# Patient Record
Sex: FEMALE | Race: WHITE | NOT HISPANIC OR LATINO | Employment: FULL TIME | URBAN - METROPOLITAN AREA
[De-identification: names, ages, dates, MRNs, and addresses within clinical notes are randomized per-mention and may not be internally consistent; named-entity substitution may affect disease eponyms.]

---

## 2024-05-05 ENCOUNTER — APPOINTMENT (OUTPATIENT)
Dept: RADIOLOGY | Facility: HOSPITAL | Age: 49
End: 2024-05-05
Payer: COMMERCIAL

## 2024-05-05 ENCOUNTER — HOSPITAL ENCOUNTER (EMERGENCY)
Facility: HOSPITAL | Age: 49
Discharge: HOME | End: 2024-05-05
Payer: COMMERCIAL

## 2024-05-05 VITALS
TEMPERATURE: 98.2 F | BODY MASS INDEX: 24.91 KG/M2 | WEIGHT: 155 LBS | SYSTOLIC BLOOD PRESSURE: 137 MMHG | DIASTOLIC BLOOD PRESSURE: 92 MMHG | OXYGEN SATURATION: 97 % | HEIGHT: 66 IN | HEART RATE: 95 BPM | RESPIRATION RATE: 15 BRPM

## 2024-05-05 DIAGNOSIS — M54.2 CERVICAL PAIN: Primary | ICD-10-CM

## 2024-05-05 PROCEDURE — 99284 EMERGENCY DEPT VISIT MOD MDM: CPT | Mod: 25

## 2024-05-05 PROCEDURE — 2500000005 HC RX 250 GENERAL PHARMACY W/O HCPCS: Performed by: NURSE PRACTITIONER

## 2024-05-05 PROCEDURE — 72125 CT NECK SPINE W/O DYE: CPT

## 2024-05-05 PROCEDURE — 72125 CT NECK SPINE W/O DYE: CPT | Performed by: RADIOLOGY

## 2024-05-05 PROCEDURE — 2500000001 HC RX 250 WO HCPCS SELF ADMINISTERED DRUGS (ALT 637 FOR MEDICARE OP): Performed by: NURSE PRACTITIONER

## 2024-05-05 RX ORDER — LIDOCAINE 560 MG/1
1 PATCH PERCUTANEOUS; TOPICAL; TRANSDERMAL DAILY
Status: DISCONTINUED | OUTPATIENT
Start: 2024-05-05 | End: 2024-05-05 | Stop reason: HOSPADM

## 2024-05-05 RX ORDER — OXYCODONE AND ACETAMINOPHEN 5; 325 MG/1; MG/1
1 TABLET ORAL EVERY 6 HOURS PRN
Qty: 12 TABLET | Refills: 0 | Status: SHIPPED | OUTPATIENT
Start: 2024-05-05 | End: 2024-05-08

## 2024-05-05 RX ORDER — TIZANIDINE 4 MG/1
4 TABLET ORAL EVERY 6 HOURS PRN
Qty: 30 TABLET | Refills: 0 | Status: SHIPPED | OUTPATIENT
Start: 2024-05-05 | End: 2024-05-15

## 2024-05-05 RX ORDER — NALOXONE HYDROCHLORIDE 4 MG/.1ML
4 SPRAY NASAL AS NEEDED
Qty: 1 EACH | Refills: 0 | Status: SHIPPED | OUTPATIENT
Start: 2024-05-05 | End: 2025-05-05

## 2024-05-05 RX ORDER — OXYCODONE AND ACETAMINOPHEN 5; 325 MG/1; MG/1
1 TABLET ORAL ONCE
Status: COMPLETED | OUTPATIENT
Start: 2024-05-05 | End: 2024-05-05

## 2024-05-05 RX ORDER — LIDOCAINE 50 MG/G
1 PATCH TOPICAL DAILY
Qty: 14 PATCH | Refills: 2 | Status: SHIPPED | OUTPATIENT
Start: 2024-05-05

## 2024-05-05 RX ORDER — OXYCODONE AND ACETAMINOPHEN 5; 325 MG/1; MG/1
1 TABLET ORAL EVERY 6 HOURS PRN
Qty: 12 TABLET | Refills: 0 | Status: SHIPPED | OUTPATIENT
Start: 2024-05-05 | End: 2024-05-05

## 2024-05-05 RX ADMIN — LIDOCAINE 1 PATCH: 4 PATCH TOPICAL at 19:45

## 2024-05-05 RX ADMIN — OXYCODONE HYDROCHLORIDE AND ACETAMINOPHEN 1 TABLET: 5; 325 TABLET ORAL at 19:45

## 2024-05-05 ASSESSMENT — PAIN - FUNCTIONAL ASSESSMENT: PAIN_FUNCTIONAL_ASSESSMENT: 0-10

## 2024-05-05 ASSESSMENT — LIFESTYLE VARIABLES
EVER FELT BAD OR GUILTY ABOUT YOUR DRINKING: NO
EVER HAD A DRINK FIRST THING IN THE MORNING TO STEADY YOUR NERVES TO GET RID OF A HANGOVER: NO
TOTAL SCORE: 0
HAVE PEOPLE ANNOYED YOU BY CRITICIZING YOUR DRINKING: NO
HAVE YOU EVER FELT YOU SHOULD CUT DOWN ON YOUR DRINKING: NO

## 2024-05-05 ASSESSMENT — COLUMBIA-SUICIDE SEVERITY RATING SCALE - C-SSRS
1. IN THE PAST MONTH, HAVE YOU WISHED YOU WERE DEAD OR WISHED YOU COULD GO TO SLEEP AND NOT WAKE UP?: NO
6. HAVE YOU EVER DONE ANYTHING, STARTED TO DO ANYTHING, OR PREPARED TO DO ANYTHING TO END YOUR LIFE?: NO
2. HAVE YOU ACTUALLY HAD ANY THOUGHTS OF KILLING YOURSELF?: NO

## 2024-05-05 ASSESSMENT — PAIN SCALES - GENERAL: PAINLEVEL_OUTOF10: 5 - MODERATE PAIN

## 2024-05-05 ASSESSMENT — PAIN DESCRIPTION - PAIN TYPE: TYPE: ACUTE PAIN

## 2024-05-05 ASSESSMENT — PAIN DESCRIPTION - LOCATION: LOCATION: NECK

## 2024-05-05 ASSESSMENT — PAIN DESCRIPTION - DESCRIPTORS: DESCRIPTORS: TINGLING;OTHER (COMMENT)

## 2024-05-05 NOTE — DISCHARGE INSTRUCTIONS
Continue steroids and then switch over to ibuprofen 200 to 400 mg every 6-8 hours  as needed  make sure to eat on this medication  Lidoderm patch - use daily  Use pain meds sparingly - but never when you use the muscle relaxers  Use the muscle relaxers as prescribed  Followup with doctor

## 2024-05-05 NOTE — Clinical Note
Ella Douglas was seen and treated in our emergency department on 5/5/2024.  She may return to work on 05/08/2024.       If you have any questions or concerns, please don't hesitate to call.      Elise Adair, APRN-CNP

## 2024-05-05 NOTE — ED PROVIDER NOTES
[facility]  ED  Encounter Note  Admit Date/RoomTime: 2024  5:22 PM  ED Room: [place] [address]  NAME: Ella Douglas  : 1975  MRN: [id number]     Chief Complaint:  Neck Pain    HISTORY OF PRESENT ILLNESS        Ella Douglas is a 48 y.o. female who presents to the ED for evaluation of cervical neck pain. She stated that it started nearly 5-6 days ago. Went to the  few days ago and was prescribed Steroids and muscle relaxers. Patient stated that she has not always taken the muscle relaxers as prescribed as they make her tired. She stated that she had a massage yesterday and thought helped but today the pain flared up. She stated took the steroids, nsaids and muscle relaxer. She stated that the spasms seem slightly improved now. She has some tingling in her right arm but no weakness, numbness. Denied any previous trauma or falls. Admits that she was doing a lot of yard work recently.  Denied other medications.     ROS   Pertinent positives and negatives are stated within HPI, all other systems reviewed and are negative.    Past Medical History:  has a past medical history of Personal history of other diseases of the circulatory system.    Surgical History:  has a past surgical history that includes MR angio head wo IV contrast (2022).    Social History:  reports that she has never smoked. She has never used smokeless tobacco. She reports that she does not drink alcohol and does not use drugs.    Family History: family history is not on file.     Allergies: Patient has no known allergies.    PHYSICAL EXAM   Oxygen Saturation Interpretation: Normal.     Physical Exam  Constitutional/General: Alert and oriented x3, well appearing, non toxic  HEENT:  NC/NT. PERRLA.  Airway patent.  Neck: Supple, full ROM. No midline vertebral tenderness or crepitus.   Respiratory: Lung sounds clear to auscultation bilaterally. No wheezes, rhonchi or stridor. Not in respiratory distress.  CV:   Regular rate. Regular rhythm. No murmurs or rubs. 2+ distal pulses.  GI:  Abdomen soft, non-tender, non-distended. +BS. No rebound, guarding, or rigidity. No pulsatile masses.  Musculoskeletal: Moves all extremities x 4. Warm and well perfused. Capillary refill <3 seconds  Integument: Skin warm and dry. No rashes.   Neurologic: Alert and oriented with no focal deficits, symmetric strength 5/5 in the upper and lower extremities bilaterally.  Psychiatric: Normal affect.  ++ strong  strength in both hands no cervical pain with palpitation     Lab / Imaging Results   (All laboratory and radiology results have been personally reviewed by myself)  Labs:  Results for orders placed or performed in visit on 07/01/22   CBC and Auto Differential   Result Value Ref Range    WBC 6.9 4.4 - 11.3 x10E9/L    RBC 4.67 4.00 - 5.20 x10E12/L    Hemoglobin 15.7 12.0 - 16.0 g/dL    Hematocrit 44.3 36.0 - 46.0 %    MCV 95 80 - 100 fL    MCHC 35.4 32.0 - 36.0 g/dL    Platelets 278 150 - 450 x10E9/L    RDW 13.3 11.5 - 14.5 %    Neutrophils % 58.1 40.0 - 80.0 %    Immature Granulocytes %, Automated 0.4 0.0 - 0.9 %    Lymphocytes % 31.0 13.0 - 44.0 %    Monocytes % 8.4 2.0 - 10.0 %    Eosinophils % 1.4 0.0 - 6.0 %    Basophils % 0.7 0.0 - 2.0 %    Neutrophils Absolute 4.03 1.20 - 7.70 x10E9/L    Lymphocytes Absolute 2.15 1.20 - 4.80 x10E9/L    Monocytes Absolute 0.58 0.10 - 1.00 x10E9/L    Eosinophils Absolute 0.10 0.00 - 0.70 x10E9/L    Basophils Absolute 0.05 0.00 - 0.10 x10E9/L   Comprehensive Metabolic Panel   Result Value Ref Range    Glucose 108 (H) 74 - 99 mg/dL    Sodium 138 136 - 145 mmol/L    Potassium 3.8 3.5 - 5.3 mmol/L    Chloride 101 98 - 107 mmol/L    Bicarbonate 24 21 - 32 mmol/L    Anion Gap 17 10 - 20 mmol/L    Urea Nitrogen 10 6 - 23 mg/dL    Creatinine 0.93 0.50 - 1.05 mg/dL    GFR Female 77 >90 mL/min/1.73m2    Calcium 9.6 8.6 - 10.3 mg/dL    Albumin 5.0 3.4 - 5.0 g/dL    Alkaline Phosphatase 75 33 - 110 U/L    Total  Protein 8.2 6.4 - 8.2 g/dL    AST 27 9 - 39 U/L    Total Bilirubin 2.0 (H) 0.0 - 1.2 mg/dL    ALT (SGPT) 18 7 - 45 U/L     Imaging:  All Radiology results interpreted by Radiologist unless otherwise noted.  CT cervical spine wo IV contrast   Final Result   1. No acute abnormality or substantial degenerative change identified   in the cervical spine.             Signed by: Lion Barrios 5/5/2024 6:43 PM   Dictation workstation:   [id number]          ED Course / Medical Decision Making     Medications   oxyCODONE-acetaminophen (Percocet) 5-325 mg per tablet 1 tablet (1 tablet oral Given 5/5/24 1945)     Diagnoses as of 05/05/24 2325   Cervical pain     Re-examination:    Patient’s condition stable.    Consult(s):   None    Procedure(s):   none    MDM:         Ella Douglas is a 48 y.o. female who presents to the ED for evaluation of cervical neck pain. She stated that it started nearly 5-6 days ago. Went to the  few days ago and was prescribed Steroids and muscle relaxers. Patient stated that she has not always taken the muscle relaxers as prescribed as they make her tired. She stated that she had a massage yesterday and thought helped but today the pain flared up. She stated took the steroids, nsaids and muscle relaxer. She stated that the spasms seem slightly improved now. She has some tingling in her right arm but no weakness, numbness. Denied any previous trauma or falls. Admits that she was doing a lot of yard work recently.  Denied other medications.     ED course stable  Will give dose of pain med lidoderm patch  Ct scan cervical spine showed muscle spasms.  Copy provided to pt.   Recommended muscle relaxer finish steroids, use nsaids after steroids done.  Lidoderm patch. Small amount of narcotic pain meds but don't take them together.  Followup with PCP may need physical therapy.   Discharge home  Ddx: cervical sprain /strain  Plan of Care/Counseling:  I reviewed today's visit with the patient and spouse  in addition to providing specific details for the plan of care and counseling regarding the diagnosis and prognosis.  Questions are answered at this time and are agreeable with the plan.    ASSESSMENT     1. Cervical pain      PLAN   Home Nonsteroidals, Tylenol, Narcotic pain medication, Muscle relaxants, and Analgesics  Diagnostic tests were reviewed and questions answered. Diagnosis, care plan and treatment options were discussed. The patient and spouse/SO understand instructions and will follow up as directed.  Condition completed  The patient and spouse was given verbal follow-up instructions  Patient condition is good    New Medications     New Medications Ordered This Visit   Medications    oxyCODONE-acetaminophen (Percocet) 5-325 mg per tablet 1 tablet     Order Specific Question:   If ordered PRN for pain, nurse is permitted to administer this medication for higher pain scores based on patient preference?     Answer:   Yes    tiZANidine (Zanaflex) 4 mg tablet     Sig: Take 1 tablet (4 mg) by mouth every 6 hours if needed for muscle spasms for up to 10 days.     Dispense:  30 tablet     Refill:  0    naloxone (Narcan) 4 mg/0.1 mL nasal spray     Sig: Administer 1 spray (4 mg) into affected nostril(s) if needed for opioid reversal or respiratory depression for up to 1 dose. May repeat every 2-3 minutes if needed, alternating nostrils, until medical assistance becomes available.     Dispense:  1 each     Refill:  0    lidocaine (Lidoderm) 5 % patch     Sig: Place 1 patch over 12 hours on the skin once daily. Remove & discard patch within 12 hours or as directed by MD.     Dispense:  14 patch     Refill:  2    oxyCODONE-acetaminophen (Percocet) 5-325 mg tablet     Sig: Take 1 tablet by mouth every 6 hours if needed for severe pain (7 - 10) for up to 3 days.     Dispense:  12 tablet     Refill:  0     Electronically signed by HAL Danielle-CNP   **This report was transcribed using voice recognition  software. Every effort was made to ensure accuracy; however, inadvertent computerized transcription errors may be present.  END OF ED PROVIDER NOTE     HAL Danielle-BABITA  05/05/24 3110

## 2024-05-05 NOTE — ED TRIAGE NOTES
Pt states she was working in the yard a week ago and has a stiff neck with tingling down her arm since. She has seen her PCP who gave her muscle relaxers. Pt states she has been on them for 4 days with no relief.

## 2024-08-09 ENCOUNTER — HOSPITAL ENCOUNTER (OUTPATIENT)
Dept: RADIOLOGY | Facility: HOSPITAL | Age: 49
Discharge: HOME | End: 2024-08-09
Payer: COMMERCIAL

## 2024-08-09 VITALS — HEIGHT: 66 IN | WEIGHT: 155 LBS | BODY MASS INDEX: 24.91 KG/M2

## 2024-08-09 DIAGNOSIS — Z12.31 SCREENING MAMMOGRAM FOR BREAST CANCER: ICD-10-CM

## 2024-08-09 PROCEDURE — 77067 SCR MAMMO BI INCL CAD: CPT | Performed by: STUDENT IN AN ORGANIZED HEALTH CARE EDUCATION/TRAINING PROGRAM

## 2024-08-09 PROCEDURE — 77063 BREAST TOMOSYNTHESIS BI: CPT | Performed by: STUDENT IN AN ORGANIZED HEALTH CARE EDUCATION/TRAINING PROGRAM

## 2024-08-09 PROCEDURE — 77067 SCR MAMMO BI INCL CAD: CPT

## 2024-10-05 ENCOUNTER — APPOINTMENT (OUTPATIENT)
Dept: CARDIOLOGY | Facility: HOSPITAL | Age: 49
End: 2024-10-05
Payer: COMMERCIAL

## 2024-10-05 ENCOUNTER — ANCILLARY PROCEDURE (OUTPATIENT)
Dept: URGENT CARE | Age: 49
End: 2024-10-05
Payer: COMMERCIAL

## 2024-10-05 ENCOUNTER — APPOINTMENT (OUTPATIENT)
Dept: RADIOLOGY | Facility: HOSPITAL | Age: 49
End: 2024-10-05
Payer: COMMERCIAL

## 2024-10-05 ENCOUNTER — OFFICE VISIT (OUTPATIENT)
Dept: URGENT CARE | Age: 49
End: 2024-10-05
Payer: COMMERCIAL

## 2024-10-05 ENCOUNTER — HOSPITAL ENCOUNTER (EMERGENCY)
Facility: HOSPITAL | Age: 49
Discharge: HOME | End: 2024-10-05
Payer: COMMERCIAL

## 2024-10-05 VITALS
TEMPERATURE: 98 F | DIASTOLIC BLOOD PRESSURE: 90 MMHG | OXYGEN SATURATION: 99 % | SYSTOLIC BLOOD PRESSURE: 162 MMHG | HEART RATE: 122 BPM

## 2024-10-05 VITALS
HEIGHT: 66 IN | SYSTOLIC BLOOD PRESSURE: 111 MMHG | BODY MASS INDEX: 25.71 KG/M2 | DIASTOLIC BLOOD PRESSURE: 77 MMHG | RESPIRATION RATE: 18 BRPM | OXYGEN SATURATION: 98 % | HEART RATE: 82 BPM | TEMPERATURE: 98.6 F | WEIGHT: 160 LBS

## 2024-10-05 DIAGNOSIS — R07.9 CHEST PAIN, UNSPECIFIED TYPE: ICD-10-CM

## 2024-10-05 DIAGNOSIS — R05.9 COUGH, UNSPECIFIED TYPE: ICD-10-CM

## 2024-10-05 DIAGNOSIS — R07.9 CHEST PAIN, UNSPECIFIED TYPE: Primary | ICD-10-CM

## 2024-10-05 DIAGNOSIS — R00.0 TACHYCARDIA: ICD-10-CM

## 2024-10-05 LAB
ALBUMIN SERPL BCP-MCNC: 4.9 G/DL (ref 3.4–5)
ALP SERPL-CCNC: 71 U/L (ref 33–110)
ALT SERPL W P-5'-P-CCNC: 17 U/L (ref 7–45)
ANION GAP SERPL CALC-SCNC: 16 MMOL/L (ref 10–20)
AST SERPL W P-5'-P-CCNC: 30 U/L (ref 9–39)
BASOPHILS # BLD AUTO: 0.04 X10*3/UL (ref 0–0.1)
BASOPHILS NFR BLD AUTO: 0.4 %
BILIRUB SERPL-MCNC: 1.1 MG/DL (ref 0–1.2)
BNP SERPL-MCNC: 14 PG/ML (ref 0–99)
BUN SERPL-MCNC: 14 MG/DL (ref 6–23)
CALCIUM SERPL-MCNC: 9.3 MG/DL (ref 8.6–10.3)
CARDIAC TROPONIN I PNL SERPL HS: 3 NG/L (ref 0–13)
CHLORIDE SERPL-SCNC: 102 MMOL/L (ref 98–107)
CO2 SERPL-SCNC: 22 MMOL/L (ref 21–32)
CREAT SERPL-MCNC: 0.92 MG/DL (ref 0.5–1.05)
EGFRCR SERPLBLD CKD-EPI 2021: 77 ML/MIN/1.73M*2
EOSINOPHIL # BLD AUTO: 0.03 X10*3/UL (ref 0–0.7)
EOSINOPHIL NFR BLD AUTO: 0.3 %
ERYTHROCYTE [DISTWIDTH] IN BLOOD BY AUTOMATED COUNT: 13.2 % (ref 11.5–14.5)
GLUCOSE SERPL-MCNC: 115 MG/DL (ref 74–99)
HCG UR QL IA.RAPID: NEGATIVE
HCT VFR BLD AUTO: 41.6 % (ref 36–46)
HGB BLD-MCNC: 15.1 G/DL (ref 12–16)
IMM GRANULOCYTES # BLD AUTO: 0.06 X10*3/UL (ref 0–0.7)
IMM GRANULOCYTES NFR BLD AUTO: 0.5 % (ref 0–0.9)
LYMPHOCYTES # BLD AUTO: 1.62 X10*3/UL (ref 1.2–4.8)
LYMPHOCYTES NFR BLD AUTO: 14.8 %
MCH RBC QN AUTO: 33.9 PG (ref 26–34)
MCHC RBC AUTO-ENTMCNC: 36.3 G/DL (ref 32–36)
MCV RBC AUTO: 93 FL (ref 80–100)
MONOCYTES # BLD AUTO: 0.54 X10*3/UL (ref 0.1–1)
MONOCYTES NFR BLD AUTO: 4.9 %
NEUTROPHILS # BLD AUTO: 8.65 X10*3/UL (ref 1.2–7.7)
NEUTROPHILS NFR BLD AUTO: 79.1 %
NRBC BLD-RTO: 0 /100 WBCS (ref 0–0)
PLATELET # BLD AUTO: 353 X10*3/UL (ref 150–450)
POC RAPID INFLUENZA A: NEGATIVE
POC RAPID INFLUENZA B: NEGATIVE
POC SARS-COV-2 AG BINAX: NORMAL
POTASSIUM SERPL-SCNC: 3.9 MMOL/L (ref 3.5–5.3)
PREGNANCY TEST URINE, POC: NEGATIVE
PROT SERPL-MCNC: 8.1 G/DL (ref 6.4–8.2)
RBC # BLD AUTO: 4.46 X10*6/UL (ref 4–5.2)
SODIUM SERPL-SCNC: 136 MMOL/L (ref 136–145)
WBC # BLD AUTO: 10.9 X10*3/UL (ref 4.4–11.3)

## 2024-10-05 PROCEDURE — 2500000004 HC RX 250 GENERAL PHARMACY W/ HCPCS (ALT 636 FOR OP/ED): Performed by: NURSE PRACTITIONER

## 2024-10-05 PROCEDURE — 71275 CT ANGIOGRAPHY CHEST: CPT

## 2024-10-05 PROCEDURE — 80053 COMPREHEN METABOLIC PANEL: CPT | Performed by: NURSE PRACTITIONER

## 2024-10-05 PROCEDURE — 96360 HYDRATION IV INFUSION INIT: CPT

## 2024-10-05 PROCEDURE — 36415 COLL VENOUS BLD VENIPUNCTURE: CPT | Performed by: NURSE PRACTITIONER

## 2024-10-05 PROCEDURE — 83880 ASSAY OF NATRIURETIC PEPTIDE: CPT | Performed by: NURSE PRACTITIONER

## 2024-10-05 PROCEDURE — 81025 URINE PREGNANCY TEST: CPT | Performed by: NURSE PRACTITIONER

## 2024-10-05 PROCEDURE — 85025 COMPLETE CBC W/AUTO DIFF WBC: CPT | Performed by: NURSE PRACTITIONER

## 2024-10-05 PROCEDURE — 84484 ASSAY OF TROPONIN QUANT: CPT | Performed by: NURSE PRACTITIONER

## 2024-10-05 PROCEDURE — 71275 CT ANGIOGRAPHY CHEST: CPT | Performed by: STUDENT IN AN ORGANIZED HEALTH CARE EDUCATION/TRAINING PROGRAM

## 2024-10-05 PROCEDURE — 93005 ELECTROCARDIOGRAM TRACING: CPT

## 2024-10-05 PROCEDURE — 71046 X-RAY EXAM CHEST 2 VIEWS: CPT

## 2024-10-05 PROCEDURE — 2550000001 HC RX 255 CONTRASTS: Performed by: NURSE PRACTITIONER

## 2024-10-05 PROCEDURE — 96361 HYDRATE IV INFUSION ADD-ON: CPT

## 2024-10-05 PROCEDURE — 99285 EMERGENCY DEPT VISIT HI MDM: CPT

## 2024-10-05 RX ADMIN — SODIUM CHLORIDE 1000 ML: 9 INJECTION, SOLUTION INTRAVENOUS at 19:54

## 2024-10-05 RX ADMIN — IOHEXOL 75 ML: 350 INJECTION, SOLUTION INTRAVENOUS at 20:46

## 2024-10-05 ASSESSMENT — HEART SCORE
HISTORY: SLIGHTLY SUSPICIOUS
ECG: NORMAL
TROPONIN: LESS THAN OR EQUAL TO NORMAL LIMIT
HEART SCORE: 1
AGE: 45-64
RISK FACTORS: NO KNOWN RISK FACTORS

## 2024-10-05 ASSESSMENT — PAIN SCALES - GENERAL
PAINLEVEL_OUTOF10: 0 - NO PAIN
PAINLEVEL_OUTOF10: 0 - NO PAIN

## 2024-10-05 ASSESSMENT — COLUMBIA-SUICIDE SEVERITY RATING SCALE - C-SSRS
6. HAVE YOU EVER DONE ANYTHING, STARTED TO DO ANYTHING, OR PREPARED TO DO ANYTHING TO END YOUR LIFE?: NO
1. IN THE PAST MONTH, HAVE YOU WISHED YOU WERE DEAD OR WISHED YOU COULD GO TO SLEEP AND NOT WAKE UP?: NO
2. HAVE YOU ACTUALLY HAD ANY THOUGHTS OF KILLING YOURSELF?: NO

## 2024-10-05 ASSESSMENT — PAIN - FUNCTIONAL ASSESSMENT: PAIN_FUNCTIONAL_ASSESSMENT: 0-10

## 2024-10-05 NOTE — ED PROVIDER NOTES
Chief Complaint   Patient presents with   • Rapid Heart Rate     Tachycardic over last week at rest over 100 per pt.  Chest tightness.  Elevated BP as well.  Recent long distance travel.         HPI       49 year old female presents to the Emergency Department today complaining of an elevated heart rate over the past week. Notes that she had had left sided chest tightness that has been constant, non-radiating, and varies intensity. Has had shortness of breath associated with the above. Reports that she is improving from a recent URI for which she lost her sense of smell, but tested negative for COVID. Denies any associated fever, chills, headache, neck pain, abdominal pain, nausea, vomiting, diarrhea, constipation, or urinary symptoms. Reports that she has had similar episodes of tachycardia following a viral illness in the past. States that he had recent 4 hour plane trips. No recent periods of immobilization, recent surgeries, history of cancer, lower extremity edema/pain, or prior history of DVT/PE.       History provided by:  Patient             Patient History   Past Medical History:   Diagnosis Date   • Personal history of other diseases of the circulatory system     History of varicose veins of lower extremity     Past Surgical History:   Procedure Laterality Date   • MR HEAD ANGIO WO IV CONTRAST  7/21/2022    MR HEAD ANGIO WO IV CONTRAST 7/21/2022 [facility]     No family history on file.  Social History     Tobacco Use   • Smoking status: Never   • Smokeless tobacco: Never   Substance Use Topics   • Alcohol use: Never   • Drug use: Never           Physical Exam  Constitutional:       Appearance: Normal appearance.   HENT:      Head: Normocephalic.      Right Ear: Tympanic membrane, ear canal and external ear normal.      Left Ear: Tympanic membrane, ear canal and external ear normal.      Nose: Nose normal.      Mouth/Throat:      Mouth: Mucous membranes are moist.      Pharynx: Oropharynx is  clear. No oropharyngeal exudate or posterior oropharyngeal erythema.   Eyes:      Conjunctiva/sclera: Conjunctivae normal.      Pupils: Pupils are equal, round, and reactive to light.   Cardiovascular:      Rate and Rhythm: Regular rhythm. Tachycardia present.      Pulses:           Radial pulses are 3+ on the right side and 3+ on the left side.        Dorsalis pedis pulses are 3+ on the right side and 3+ on the left side.      Heart sounds: Normal heart sounds. No murmur heard.     No friction rub. No gallop.   Pulmonary:      Effort: Pulmonary effort is normal. No respiratory distress.      Breath sounds: Normal breath sounds. No wheezing, rhonchi or rales.   Abdominal:      General: Abdomen is flat. Bowel sounds are normal.      Palpations: Abdomen is soft.      Tenderness: There is no abdominal tenderness. There is no right CVA tenderness, left CVA tenderness, guarding or rebound. Negative signs include Liang's sign and McBurney's sign.   Musculoskeletal:         General: No swelling or deformity.      Cervical back: Full passive range of motion without pain.      Right lower leg: No edema.      Left lower leg: No edema.   Lymphadenopathy:      Cervical: No cervical adenopathy.   Skin:     Capillary Refill: Capillary refill takes less than 2 seconds.      Coloration: Skin is not jaundiced.      Findings: No rash.   Neurological:      General: No focal deficit present.      Mental Status: She is alert and oriented to person, place, and time. Mental status is at baseline.      Gait: Gait is intact.   Psychiatric:         Mood and Affect: Mood normal.         Behavior: Behavior is cooperative.         Labs Reviewed   CBC WITH AUTO DIFFERENTIAL - Abnormal       Result Value    WBC 10.9      nRBC 0.0      RBC 4.46      Hemoglobin 15.1      Hematocrit 41.6      MCV 93      MCH 33.9      MCHC 36.3 (*)     RDW 13.2      Platelets 353      Neutrophils % 79.1      Immature Granulocytes %, Automated 0.5      Lymphocytes %  14.8      Monocytes % 4.9      Eosinophils % 0.3      Basophils % 0.4      Neutrophils Absolute 8.65 (*)     Immature Granulocytes Absolute, Automated 0.06      Lymphocytes Absolute 1.62      Monocytes Absolute 0.54      Eosinophils Absolute 0.03      Basophils Absolute 0.04     COMPREHENSIVE METABOLIC PANEL - Abnormal    Glucose 115 (*)     Sodium 136      Potassium 3.9      Chloride 102      Bicarbonate 22      Anion Gap 16      Urea Nitrogen 14      Creatinine 0.92      eGFR 77      Calcium 9.3      Albumin 4.9      Alkaline Phosphatase 71      Total Protein 8.1      AST 30      Bilirubin, Total 1.1      ALT 17     TROPONIN I, HIGH SENSITIVITY - Normal    Troponin I, High Sensitivity 3      Narrative:     Less than 99th percentile of normal range cutoff-  Female and children under 18 years old <14 ng/L; Male <21 ng/L: Negative  Repeat testing should be performed if clinically indicated.     Female and children under 18 years old 14-50 ng/L; Male 21-50 ng/L:  Consistent with possible cardiac damage and possible increased clinical   risk. Serial measurements may help to assess extent of myocardial damage.     >50 ng/L: Consistent with cardiac damage, increased clinical risk and  myocardial infarction. Serial measurements may help assess extent of   myocardial damage.      NOTE: Children less than 1 year old may have higher baseline troponin   levels and results should be interpreted in conjunction with the overall   clinical context.     NOTE: Troponin I testing is performed using a different   testing methodology at [facility] than at other   [facility] hospitals. Direct result comparisons should only   be made within the same method.   HCG, URINE, QUALITATIVE - Normal    HCG, Urine NEGATIVE     B-TYPE NATRIURETIC PEPTIDE - Normal    BNP 14      Narrative:        <100 pg/mL - Heart failure unlikely  100-299 pg/mL - Intermediate probability of acute heart                  failure exacerbation. Correlate  with clinical                  context and patient history.    >=300 pg/mL - Heart Failure likely. Correlate with clinical                  context and patient history.    BNP testing is performed using different testing methodology at [facility] than at other [facility] hospitals. Direct result comparisons should only be made within the same method.          CT angio chest for pulmonary embolism   Final Result   1. No evidence of acute pulmonary embolism. No acute cardiopulmonary   process.   2. Interval increase in size of a pancreatic tail cystic lesion   measuring 2.8 cm, previously 2 cm (12/12/2019). Differential   diagnosis includes pseudocyst (if there is history of pancreatitis),   mucinous cystic neoplasm, or side branch IPMN. Given the increase in   size, recommend outpatient GI follow-up for management.        MACRO:   Critical Finding:  See findings. Notification was initiated on   10/5/2024 at 10:13 pm by  Silvio Teague.  (**-YCF-**) Instructions:        Signed by: Silvio Teague 10/5/2024 10:14 PM   Dictation workstation:   [id number]               ED Course & MDM   Diagnoses as of 10/05/24 2253   Chest pain, unspecified type           Medical Decision Making  EKG interpreted by Dr. Zuñiga. Indication: chest pain. Findings: ST with a ventricular rate of 129, normal axis, normal intervals, and no acute ischemic or injury pattern. Impression: No acute pathology.       Patient was seen and evaluated by myself. Saline lock was established with labs drawn and results as above. Given 1 Liter of normal saline wide open over 1 hour. After receiving the IV fluids, reported to feeling improved. Her heart rate improved with the above as well. Blood counts, electrolytes, kidney function, and liver function were all unremarkable. Heart Score- 1 with normal EKG and troponin. At this time, I feel that the chest pain is atypical for acute coronary syndrome. I find no underlying evidence of an acute  infectious process, pneumothorax, pulmonary embolism, or thoracic aortic dissection on CTA of her chest. Normal cardiac BNP without evidence of CHF on imaging. CT scan did show interval increase in size of a pancreatic tail cystic lesion measuring 2.8 cm, previously 2 cm (12/12/2019) which could include a pseudocyst, mucinous cystic neoplasm, or side branch IPMN. Educated on such. Referred to GI for follow up on above. Her elevated heart rate could have been related to dehydration as well. Follow up with their doctor in 3 days. Return if worse in any way. Discharged in stable condition with computer instructions.    Diagnostic Impression:    1. Acute atypical chest pain    2. IV fluids in ED  Diagnostic Impression:                Your medication list        ASK your doctor about these medications        Instructions Last Dose Given Next Dose Due   lidocaine 5 % patch  Commonly known as: Lidoderm      Place 1 patch over 12 hours on the skin once daily. Remove & discard patch within 12 hours or as directed by MD.       naloxone 4 mg/0.1 mL nasal spray  Commonly known as: Narcan      Administer 1 spray (4 mg) into affected nostril(s) if needed for opioid reversal or respiratory depression for up to 1 dose. May repeat every 2-3 minutes if needed, alternating nostrils, until medical assistance becomes available.       tiZANidine 4 mg tablet  Commonly known as: Zanaflex      Take 1 tablet (4 mg) by mouth every 6 hours if needed for muscle spasms for up to 10 days.                  Procedure  Procedures     Mehrdad Montero, HAL-CNP  10/05/24 6264

## 2024-10-05 NOTE — PROGRESS NOTES
"Subjective   History of Present Illness: Ella Douglas \"Lc\" is a 49 y.o. female. They present today with a chief complaint of burning in her chest, non productive cough x 5 days. [place] then started to have cold like symptoms. She thinks that she may have had COVID but she didn't have a positive COVID test. Pt feels anxious and she has an elevated bp and tachycardia and states that it is normal for her body to act like that when she's sick.       Past Medical History  Allergies as of 10/05/2024    (No Known Allergies)       (Not in a hospital admission)       Past Medical History:   Diagnosis Date    Personal history of other diseases of the circulatory system     History of varicose veins of lower extremity       Past Surgical History:   Procedure Laterality Date    MR HEAD ANGIO WO IV CONTRAST  7/21/2022    MR HEAD ANGIO WO IV CONTRAST 7/21/2022 [facility]        reports that she has never smoked. She has never used smokeless tobacco. She reports that she does not drink alcohol and does not use drugs.    Review of Systems  Review of Systems                               Objective    Vitals:    10/05/24 1755 10/05/24 1817   BP: (!) 159/101 162/90   Pulse: (!) 150 (!) 122   Temp: 36.7 °C (98 °F)    SpO2: 99%      Patient's last menstrual period was 09/18/2024 (approximate).    Physical Exam  HENT:      Head: Normocephalic and atraumatic.      Nose: Nose normal.      Mouth/Throat:      Mouth: Mucous membranes are moist.   Eyes:      Extraocular Movements: Extraocular movements intact.      Conjunctiva/sclera: Conjunctivae normal.      Pupils: Pupils are equal, round, and reactive to light.   Cardiovascular:      Rate and Rhythm: Tachycardia present.   Pulmonary:      Effort: Pulmonary effort is normal.      Breath sounds: Normal breath sounds. No decreased breath sounds, wheezing, rhonchi or rales.   Skin:     General: Skin is warm.   Neurological:      Mental Status: She is alert and " oriented to person, place, and time.   Psychiatric:         Mood and Affect: Mood normal.         Behavior: Behavior normal.         Procedures    Point of Care Test & Imaging Results from this visit  Results for orders placed or performed in visit on 10/05/24   POCT Influenza A/B manually resulted   Result Value Ref Range    POC Rapid Influenza A Negative Negative    POC Rapid Influenza B Negative Negative   POCT Covid-19 Rapid Antigen   Result Value Ref Range    POC OTTO-COV-2 AG  Presumptive negative test for SARS-CoV-2 (no antigen detected)     Presumptive negative test for SARS-CoV-2 (no antigen detected)   POCT pregnancy, urine manually resulted   Result Value Ref Range    Preg Test, Ur Negative Negative      XR chest 2 views    Result Date: 10/5/2024  Interpreted By:  Vinicio [place]illela, STUDY: XR CHEST 2 VIEWS;  10/5/2024 6:18 pm   INDICATION: Signs/Symptoms:chest pain.   ,R07.9 Chest pain, unspecified   COMPARISON: None.   ACCESSION NUMBER(S): [id number]   ORDERING CLINICIAN: LAUREANO YOUNG   FINDINGS:         CARDIOMEDIASTINAL SILHOUETTE: Cardiomediastinal silhouette is normal in size and configuration.   LUNGS: Lungs are clear. There is no consolidation or effusion   ABDOMEN: No remarkable upper abdominal findings.   BONES: No acute osseous changes.       1.  No evidence of acute cardiopulmonary process.       MACRO: None   Signed by: Vinicio Villela 10/5/2024 6:21 PM Dictation workstation:   [id number]     Diagnostic study results (if any) were reviewed by Laureano Young PA-C.    Assessment/Plan   Allergies, medications, history, and pertinent labs/EKGs/Imaging reviewed by Laureano Young PA-C.     Medical Decision Making  Pt remained Tachycardic and experiencing burning in her chest. I recommended to proceed to the ED for further evaluation. Pt agreed to the plan of care.     Orders and Diagnoses  Diagnoses and all orders for this visit:  Chest pain, unspecified type  -     XR chest 2 views; Future  -      POCT UA Automated manually resulted  -     POCT pregnancy, urine manually resulted  Cough, unspecified type  -     POCT Influenza A/B manually resulted  -     POCT Covid-19 Rapid Antigen  Tachycardia  -     ECG 12 Lead  -     POCT pregnancy, urine manually resulted  -     Referral to Cardiology; Future      Medical Admin Record      Patient disposition: ED    Electronically signed by Heidy Young PA-C  7:25 PM

## 2024-10-07 LAB
ATRIAL RATE: 128 BPM
P AXIS: 88 DEGREES
PR INTERVAL: 178 MS
Q ONSET: 254 MS
QRS COUNT: 20 BEATS
QRS DURATION: 152 MS
QT INTERVAL: 381 MS
QTC CALCULATION(BAZETT): 559 MS
QTC FREDERICIA: 492 MS
R AXIS: 70 DEGREES
T AXIS: -80 DEGREES
T OFFSET: 445 MS
VENTRICULAR RATE: 129 BPM

## 2024-10-15 ENCOUNTER — HOSPITAL ENCOUNTER (OUTPATIENT)
Dept: CARDIOLOGY | Facility: CLINIC | Age: 49
Discharge: HOME | End: 2024-10-15
Payer: COMMERCIAL

## 2024-10-15 ENCOUNTER — OFFICE VISIT (OUTPATIENT)
Dept: CARDIOLOGY | Facility: CLINIC | Age: 49
End: 2024-10-15
Payer: COMMERCIAL

## 2024-10-15 VITALS
WEIGHT: 166 LBS | BODY MASS INDEX: 26.68 KG/M2 | SYSTOLIC BLOOD PRESSURE: 117 MMHG | DIASTOLIC BLOOD PRESSURE: 82 MMHG | HEIGHT: 66 IN | HEART RATE: 135 BPM

## 2024-10-15 DIAGNOSIS — R00.0 TACHYCARDIA: ICD-10-CM

## 2024-10-15 DIAGNOSIS — Z13.6 SCREENING FOR CARDIOVASCULAR CONDITION: ICD-10-CM

## 2024-10-15 DIAGNOSIS — Z13.6 ENCOUNTER FOR LIPID SCREENING FOR CARDIOVASCULAR DISEASE: Primary | ICD-10-CM

## 2024-10-15 DIAGNOSIS — Z13.220 ENCOUNTER FOR LIPID SCREENING FOR CARDIOVASCULAR DISEASE: Primary | ICD-10-CM

## 2024-10-15 LAB — BODY SURFACE AREA: 1.87 M2

## 2024-10-15 PROCEDURE — 99204 OFFICE O/P NEW MOD 45 MIN: CPT | Performed by: NURSE PRACTITIONER

## 2024-10-15 PROCEDURE — 99214 OFFICE O/P EST MOD 30 MIN: CPT | Performed by: NURSE PRACTITIONER

## 2024-10-15 PROCEDURE — 3008F BODY MASS INDEX DOCD: CPT | Performed by: NURSE PRACTITIONER

## 2024-10-15 PROCEDURE — 1036F TOBACCO NON-USER: CPT | Performed by: NURSE PRACTITIONER

## 2024-10-15 PROCEDURE — 93246 EXT ECG>7D<15D RECORDING: CPT

## 2024-10-15 NOTE — PROGRESS NOTES
"Chief Complaint:   Tachycardia      History Of Present Illness:    Ella Douglas \"Lc\" is a 49 y.o. female here with tachycardia.     For the past 5 years patient will have intermittent episodes of tachycardia. Generally occurs after she has been ill. She had covid 3 weeks ago. Last week heart rate increased to 150bpm. Sent to ED. Work up benign, Troponin negative x 1. BNP negative. CT PE negative. Symptoms improved with IV fluids. Treated for URI    She notes when heart rates become elevated bp will as well. Tachycardia can last up to 2 days. HR will max at 150bpm and improve to 100bpm, which is elevated from her baseline.     Has palpitations sometimes she will fee it in her throat and have to cough.     Does not stay hydrate.   1-2 cup coffee per day  Does not drink ETOH    Father acute MI at 50 years old with CABG x3   Brother: HTN      Past Medical History:  She has a past medical history of Personal history of other diseases of the circulatory system.    Past Surgical History:  She has a past surgical history that includes MR angio head wo IV contrast (7/21/2022).      Social History:  She reports that she has never smoked. She has never used smokeless tobacco. She reports that she does not drink alcohol and does not use drugs.    Family History:  No family history on file.     Allergies:  Patient has no known allergies.    Review of Systems  All pertinent systems have been reviewed and are negative except for what is stated in the history of present illness.    All other systems have been reviewed and are negative and noncontributory to this patient's current ailments.     Visit Vitals  /82 (BP Location: Right arm, Patient Position: Sitting, BP Cuff Size: Adult)   Pulse (!) 135   Ht 1.676 m (5' 6\")   Wt 75.3 kg (166 lb)   LMP 09/18/2024 (Approximate)   BMI 26.79 kg/m²   OB Status Having periods   Smoking Status Never   BSA 1.87 m²     Last Labs:  CBC -  Lab Results   Component Value Date    WBC 10.9 " 10/05/2024    HGB 15.1 10/05/2024    HCT 41.6 10/05/2024    MCV 93 10/05/2024     10/05/2024       CMP -  Lab Results   Component Value Date    CALCIUM 9.3 10/05/2024    PHOS 3.5 06/22/2022    PROT 8.1 10/05/2024    ALBUMIN 4.9 10/05/2024    AST 30 10/05/2024    ALT 17 10/05/2024    ALKPHOS 71 10/05/2024    BILITOT 1.1 10/05/2024    BUN 14 10/05/2024    CREATININE 0.92 10/05/2024       LIPID PANEL -   Lab Results   Component Value Date    CHOL 160 12/06/2019    TRIG 42 12/06/2019    HDL 58.6 12/06/2019    CHHDL 2.7 12/06/2019    LDLF 93 12/06/2019    VLDL 8 12/06/2019       RENAL FUNCTION PANEL -   Lab Results   Component Value Date    GLUCOSE 115 (H) 10/05/2024     10/05/2024    K 3.9 10/05/2024     10/05/2024    CO2 22 10/05/2024    ANIONGAP 16 10/05/2024    BUN 14 10/05/2024    CREATININE 0.92 10/05/2024    CALCIUM 9.3 10/05/2024    PHOS 3.5 06/22/2022    ALBUMIN 4.9 10/05/2024        Lab Results   Component Value Date    BNP 14 10/05/2024    HGBA1C 4.1 12/06/2019         Objective   Vitals reviewed.   Constitutional:       Appearance: Healthy appearance. Not in distress.   Eyes:      Conjunctiva/sclera: Conjunctivae normal.   Neck:      Vascular: No JVR. JVD normal.   Pulmonary:      Effort: Pulmonary effort is normal.      Breath sounds: Normal breath sounds. No wheezing. No rhonchi. No rales.   Chest:      Chest wall: Not tender to palpatation.   Cardiovascular:      PMI at left midclavicular line. Normal rate. Regular rhythm. Normal S1. Normal S2.       Murmurs: There is no murmur.      No gallop.  No click. No rub.   Edema:     Peripheral edema absent.   Abdominal:      General: Bowel sounds are normal.      Palpations: Abdomen is soft.      Tenderness: There is no abdominal tenderness.   Musculoskeletal: Normal range of motion.         General: No tenderness. Skin:     General: Skin is warm and dry.   Neurological:      General: No focal deficit present.      Mental Status: Alert and  oriented to person, place and time.   Psychiatric:         Attention and Perception: Attention normal.         Mood and Affect: Mood normal.       Assessment/Plan   Diagnoses and all orders for this visit:  Tachycardia  - Holter or Event Cardiac Monitor; Future  - TSH with reflex to Free T4 if abnormal; Future  - encouraged hydration  - discussed vasovagal techniques   Screening for cardiovascular condition  - CT cardiac scoring wo IV contrast due to father having acute MI and cabg at 50  - checking lipids  Encounter for lipid screening for cardiovascular disease  - Lipid panel; Future    Follow up 1 month    Exclusive of any other services or procedures performed, Yuliana BARNETT-BABITA, spent 45 minutes in duration for this visit today.  This time consisted of chart review, obtaining history, and/or performing the exam as documented above, as well as, documenting the clinical information for the encounter in the electronic record, discussing treatment options, plans, and/or goals with patient, family, and/or caregiver, refilling medications, updating the electronic record, ordering medicines, lab work, imaging, referrals, and/or procedures as documented above and communicating with other [facility] professionals. I have discussed the results of laboratory, radiology, and cardiology studies with the patient and their family/caregiver.

## 2024-10-15 NOTE — PATIENT INSTRUCTIONS
Electrolyte drink: LMNT   Magnesium 400mg once a day with food     If having episodes  of tachycardia  - cough   - bear down   - breathing out forcefully via a straw     Give yourself a hug (when heart racing) or have a lemon head

## 2024-10-16 ENCOUNTER — APPOINTMENT (OUTPATIENT)
Dept: PRIMARY CARE | Facility: CLINIC | Age: 49
End: 2024-10-16
Payer: COMMERCIAL

## 2024-10-16 VITALS
WEIGHT: 165 LBS | RESPIRATION RATE: 16 BRPM | HEART RATE: 86 BPM | DIASTOLIC BLOOD PRESSURE: 78 MMHG | SYSTOLIC BLOOD PRESSURE: 126 MMHG | OXYGEN SATURATION: 98 % | HEIGHT: 66 IN | BODY MASS INDEX: 26.52 KG/M2

## 2024-10-16 DIAGNOSIS — Z00.00 ANNUAL PHYSICAL EXAM: Primary | ICD-10-CM

## 2024-10-16 DIAGNOSIS — R00.0 TACHYCARDIA: ICD-10-CM

## 2024-10-16 PROBLEM — M26.609 TMJ (TEMPOROMANDIBULAR JOINT SYNDROME): Status: ACTIVE | Noted: 2024-10-16

## 2024-10-16 PROBLEM — M53.3 COCCYDYNIA: Status: ACTIVE | Noted: 2024-10-16

## 2024-10-16 PROBLEM — I67.1 BRAIN ANEURYSM (HHS-HCC): Status: RESOLVED | Noted: 2024-10-16 | Resolved: 2024-10-16

## 2024-10-16 PROBLEM — R51.9 HEADACHE: Status: RESOLVED | Noted: 2024-10-16 | Resolved: 2024-10-16

## 2024-10-16 PROBLEM — H69.90 EUSTACHIAN TUBE DYSFUNCTION: Status: ACTIVE | Noted: 2024-10-16

## 2024-10-16 PROBLEM — H60.90 OTITIS EXTERNA: Status: ACTIVE | Noted: 2024-10-16

## 2024-10-16 PROBLEM — R51.9 HEADACHE: Status: ACTIVE | Noted: 2024-10-16

## 2024-10-16 PROBLEM — R03.0 ELEVATED BLOOD PRESSURE READING: Status: ACTIVE | Noted: 2024-10-16

## 2024-10-16 PROBLEM — I67.1 INTRACRANIAL ANEURYSM (HHS-HCC): Status: ACTIVE | Noted: 2024-10-16

## 2024-10-16 PROBLEM — F32.A DEPRESSION: Status: ACTIVE | Noted: 2024-10-16

## 2024-10-16 PROBLEM — R00.2 PALPITATIONS: Status: ACTIVE | Noted: 2024-10-16

## 2024-10-16 PROBLEM — N39.0 LOWER URINARY TRACT INFECTIOUS DISEASE: Status: ACTIVE | Noted: 2024-10-16

## 2024-10-16 PROBLEM — M25.549 ARTHRALGIA OF HAND: Status: ACTIVE | Noted: 2024-10-16

## 2024-10-16 PROBLEM — M54.2 NECK PAIN: Status: ACTIVE | Noted: 2024-10-16

## 2024-10-16 PROBLEM — F32.A DEPRESSIVE DISORDER: Status: ACTIVE | Noted: 2024-10-16

## 2024-10-16 PROBLEM — I67.1 INTRACRANIAL ANEURYSM (HHS-HCC): Status: RESOLVED | Noted: 2024-10-16 | Resolved: 2024-10-16

## 2024-10-16 PROBLEM — M99.05 SOMATIC DYSFUNCTION OF PELVIS REGION: Status: ACTIVE | Noted: 2024-10-16

## 2024-10-16 PROBLEM — H60.91 OTITIS EXTERNA OF RIGHT EAR: Status: ACTIVE | Noted: 2024-10-16

## 2024-10-16 PROBLEM — M99.01 SOMATIC DYSFUNCTION OF CERVICAL REGION: Status: ACTIVE | Noted: 2024-10-16

## 2024-10-16 PROBLEM — R07.9 CHEST PAIN: Status: ACTIVE | Noted: 2024-10-16

## 2024-10-16 PROBLEM — M99.02 SOMATIC DYSFUNCTION OF THORACIC REGION: Status: ACTIVE | Noted: 2024-10-16

## 2024-10-16 PROBLEM — R30.0 DYSURIA: Status: ACTIVE | Noted: 2024-10-16

## 2024-10-16 PROBLEM — M99.9 BIOMECHANICAL LESION, UNSPECIFIED: Status: ACTIVE | Noted: 2024-10-16

## 2024-10-16 PROBLEM — F48.9 TENSION: Status: ACTIVE | Noted: 2024-10-16

## 2024-10-16 PROBLEM — M53.3 PAIN IN THE COCCYX: Status: ACTIVE | Noted: 2024-10-16

## 2024-10-16 PROBLEM — I67.1 BRAIN ANEURYSM (HHS-HCC): Status: ACTIVE | Noted: 2024-10-16

## 2024-10-16 PROBLEM — M26.609 TEMPOROMANDIBULAR JOINT DISORDER: Status: ACTIVE | Noted: 2024-10-16

## 2024-10-16 PROBLEM — B35.1 ONYCHOMYCOSIS OF TOENAIL: Status: ACTIVE | Noted: 2024-10-16

## 2024-10-16 PROBLEM — E80.4 GILBERT'S SYNDROME: Status: ACTIVE | Noted: 2024-10-16

## 2024-10-16 PROBLEM — K86.9 DISORDER OF PANCREAS (HHS-HCC): Status: ACTIVE | Noted: 2024-10-16

## 2024-10-16 PROBLEM — K86.2 PANCREATIC CYST (HHS-HCC): Status: ACTIVE | Noted: 2024-10-16

## 2024-10-16 PROBLEM — Z86.79 HISTORY OF VARICOSE VEINS: Status: ACTIVE | Noted: 2024-10-16

## 2024-10-16 PROBLEM — M99.03 SOMATIC DYSFUNCTION OF LUMBAR REGION: Status: ACTIVE | Noted: 2024-10-16

## 2024-10-16 PROBLEM — K86.9 PANCREATIC LESION (HHS-HCC): Status: ACTIVE | Noted: 2024-10-16

## 2024-10-16 PROBLEM — M99.9 NONALLOPATHIC LESION OF HEAD REGION: Status: ACTIVE | Noted: 2024-10-16

## 2024-10-16 PROCEDURE — 3008F BODY MASS INDEX DOCD: CPT | Performed by: FAMILY MEDICINE

## 2024-10-16 PROCEDURE — 1036F TOBACCO NON-USER: CPT | Performed by: FAMILY MEDICINE

## 2024-10-16 PROCEDURE — 99386 PREV VISIT NEW AGE 40-64: CPT | Performed by: FAMILY MEDICINE

## 2024-10-16 ASSESSMENT — COLUMBIA-SUICIDE SEVERITY RATING SCALE - C-SSRS
1. IN THE PAST MONTH, HAVE YOU WISHED YOU WERE DEAD OR WISHED YOU COULD GO TO SLEEP AND NOT WAKE UP?: NO
2. HAVE YOU ACTUALLY HAD ANY THOUGHTS OF KILLING YOURSELF?: NO
6. HAVE YOU EVER DONE ANYTHING, STARTED TO DO ANYTHING, OR PREPARED TO DO ANYTHING TO END YOUR LIFE?: NO

## 2024-10-16 ASSESSMENT — ENCOUNTER SYMPTOMS
OCCASIONAL FEELINGS OF UNSTEADINESS: 0
DEPRESSION: 0
LOSS OF SENSATION IN FEET: 0

## 2024-10-16 ASSESSMENT — ANXIETY QUESTIONNAIRES
2. NOT BEING ABLE TO STOP OR CONTROL WORRYING: NOT AT ALL
GAD7 TOTAL SCORE: 0
5. BEING SO RESTLESS THAT IT IS HARD TO SIT STILL: NOT AT ALL
7. FEELING AFRAID AS IF SOMETHING AWFUL MIGHT HAPPEN: NOT AT ALL
3. WORRYING TOO MUCH ABOUT DIFFERENT THINGS: NOT AT ALL
1. FEELING NERVOUS, ANXIOUS, OR ON EDGE: NOT AT ALL
4. TROUBLE RELAXING: NOT AT ALL
IF YOU CHECKED OFF ANY PROBLEMS ON THIS QUESTIONNAIRE, HOW DIFFICULT HAVE THESE PROBLEMS MADE IT FOR YOU TO DO YOUR WORK, TAKE CARE OF THINGS AT HOME, OR GET ALONG WITH OTHER PEOPLE: NOT DIFFICULT AT ALL
6. BECOMING EASILY ANNOYED OR IRRITABLE: NOT AT ALL

## 2024-10-16 ASSESSMENT — PATIENT HEALTH QUESTIONNAIRE - PHQ9
1. LITTLE INTEREST OR PLEASURE IN DOING THINGS: NOT AT ALL
2. FEELING DOWN, DEPRESSED OR HOPELESS: NOT AT ALL
SUM OF ALL RESPONSES TO PHQ9 QUESTIONS 1 AND 2: 0

## 2024-10-16 NOTE — PROGRESS NOTES
"Mike Douglas \"Ali\" is a 49 y.o. female and is here for a comprehensive physical exam. The patient reports problems - Patient has had a few episodes of tachycardia- once with first COVID.  .Then had a few more times then most recently with COVID again. She went to urgent care, and she was sent to ER-In addition anxiety has increased- she went to [employer], they were swimming and she got panicked and scared. She made it to boat, then was snorkeling and had bad swimming anxiety again, Since she has been back, she got COVID, got sick, then heart racing began, Saw Cardiology yesterday, and ordered lipid and thyroid. Has gained more than ten pounds in last year or two. Had to have Mirena- Last physical: years.  Had MRI MRA years ago for headache, no aneurysm, neck pain 2024, resolved with chiropractic.   Changes yes  Concerns no  Dentist yes  Vision yes  Hearing Problems no  Diet yes  Exercise yes  Alcohol no  Drugs no  Social History     Tobacco Use   Smoking Status Never   Smokeless Tobacco Never          Do you take any herbs or supplements that were not prescribed by a doctor? no  Are you taking calcium supplements? no  Are you taking aspirin daily? no      History:  LMP: Periods go on and off, heavy at beginning, always have been 24 25 days cycle.   Menopause at n/a years  Last pap date: 2022  Abnormal pap? no  : 5  Para: 5    Do you have pain that bothers you in your daily life? no  Review of Systems   All other systems reviewed and are negative.       Objective   Physical Exam  Vitals reviewed.   Constitutional:       Appearance: Normal appearance.   HENT:      Head: Normocephalic and atraumatic.      Right Ear: Tympanic membrane normal.      Left Ear: Tympanic membrane normal.      Nose: Nose normal.      Mouth/Throat:      Mouth: Mucous membranes are moist.      Pharynx: Oropharynx is clear.   Eyes:      Extraocular Movements: Extraocular movements intact.      Conjunctiva/sclera: " Conjunctivae normal.      Pupils: Pupils are equal, round, and reactive to light.   Cardiovascular:      Rate and Rhythm: Normal rate and regular rhythm.      Pulses: Normal pulses.      Heart sounds: Normal heart sounds.   Pulmonary:      Effort: Pulmonary effort is normal.      Breath sounds: Normal breath sounds.   Abdominal:      General: Abdomen is flat. Bowel sounds are normal.      Palpations: Abdomen is soft.   Musculoskeletal:         General: Normal range of motion.      Cervical back: Normal range of motion and neck supple.   Skin:     General: Skin is warm and dry.      Capillary Refill: Capillary refill takes less than 2 seconds.   Neurological:      General: No focal deficit present.      Mental Status: She is alert and oriented to person, place, and time.   Psychiatric:         Mood and Affect: Mood normal.         Behavior: Behavior normal.       Assessment/Plan   Healthy female exam. Annual exam today.      1. 49 year old female for annual   2. Patient Counseling:  --Nutrition: Stressed importance of moderation in sodium/caffeine intake, saturated fat and cholesterol, caloric balance, sufficient intake of fresh fruits, vegetables, fiber, calcium, iron, and 1 mg of folate supplement per day (for females capable of pregnancy).  --Discussed the issue of estrogen replacement, calcium supplement, and the daily use of baby aspirin.  --Exercise: Stressed the importance of regular exercise.   --Substance Abuse: Discussed cessation/primary prevention of tobacco, alcohol, or other drug use; driving or other dangerous activities under the influence; availability of treatment for abuse.    --Sexuality: Discussed sexually transmitted diseases, partner selection, use of condoms, avoidance of unintended pregnancy  and contraceptive alternatives.   --Injury prevention: Discussed safety belts, safety helmets, smoke detector, smoking near bedding or upholstery.   --Dental health: Discussed importance of regular tooth  brushing, flossing, and dental visits.  --Immunizations reviewed.  --Discussed benefits of screening colonoscopy.  --After hours service discussed with patient  3. Discussed the patient's BMI with her.  The BMI is in the acceptable range.  4. Follow up in one year

## 2024-10-16 NOTE — PATIENT INSTRUCTIONS
Discussed weight - Discussed at length methods for weight loss.  Advised application such as my fitness pal to log calories.  64 ounces of water daily.  No calories from beverages.  Aim for 40% carbs, 30% protein 30% fat.  Will adjust those percentages at follow-up.  Increase movement to 5-10,000 steps a day and perform weightbearing and cardio exercise 3 to 5 days/week.  Start with limiting eating window to 12 hours, decreasing by 1 hour at a time if tolerated.  Avoid refined foods, sugar, processed foods, and simple starches.  Lean meats and seafood, unlimited vegetables.  Try and make healthy eating choices.     Will await monitor results  Enrolled in pancreatic cyst clinic  Follow up annually.

## 2024-10-22 ENCOUNTER — LAB (OUTPATIENT)
Dept: LAB | Facility: LAB | Age: 49
End: 2024-10-22
Payer: COMMERCIAL

## 2024-10-22 DIAGNOSIS — Z13.220 ENCOUNTER FOR LIPID SCREENING FOR CARDIOVASCULAR DISEASE: ICD-10-CM

## 2024-10-22 DIAGNOSIS — R00.0 TACHYCARDIA: ICD-10-CM

## 2024-10-22 DIAGNOSIS — Z13.6 ENCOUNTER FOR LIPID SCREENING FOR CARDIOVASCULAR DISEASE: ICD-10-CM

## 2024-10-22 LAB
CHOLEST SERPL-MCNC: 168 MG/DL (ref 0–199)
CHOLESTEROL/HDL RATIO: 2.7
HDLC SERPL-MCNC: 61.4 MG/DL
LDLC SERPL CALC-MCNC: 92 MG/DL
NON HDL CHOLESTEROL: 107 MG/DL (ref 0–149)
TRIGL SERPL-MCNC: 73 MG/DL (ref 0–149)
TSH SERPL-ACNC: 1.38 MIU/L (ref 0.44–3.98)
VLDL: 15 MG/DL (ref 0–40)

## 2024-10-22 PROCEDURE — 36415 COLL VENOUS BLD VENIPUNCTURE: CPT

## 2024-10-22 PROCEDURE — 84443 ASSAY THYROID STIM HORMONE: CPT

## 2024-10-22 PROCEDURE — 80061 LIPID PANEL: CPT

## 2024-10-23 ENCOUNTER — TELEMEDICINE (OUTPATIENT)
Dept: SURGICAL ONCOLOGY | Facility: CLINIC | Age: 49
End: 2024-10-23
Payer: COMMERCIAL

## 2024-10-23 DIAGNOSIS — K86.2 PANCREATIC CYST (HHS-HCC): Primary | ICD-10-CM

## 2024-10-23 PROCEDURE — 99204 OFFICE O/P NEW MOD 45 MIN: CPT | Performed by: PHYSICIAN ASSISTANT

## 2024-10-23 NOTE — PROGRESS NOTES
"A virtual visit (audio and visual connection) between the patient (at the originating site) and the provider (at the distant site) was utilized to provide this telehealth service.    Verbal consent was requested and obtained from the patient immediately prior to the telehealth visit.     Subjective   Mrs. 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    She presented to the ED in early October with complaint of left-sided chest tightness. As part of that work-up, she had a CT Angio Chest that demonstrated \"an interval increase in the size of a pancreatic tail cystic lesion measuring 2.8 cm (previously 2.0 cm).\" Her imaging came up on my pancreas radiology review and I messaged Dr. Carson offering to see her to discuss.     She has known about this cyst for approximately 5 years after it was incidentally found on chest imaging. In 2019, she underwent EUS with Dr. Lucila Friedman that demonstrated a septated cyst in the pancreatic tail without obvious communication with the [facility] measuring 1.5 cm x 1.2 cm s/p FNA. String sign was negative. Fluid studies show a CEA of 1.2 and a glucose of 94. She was never told she needed to follow-up.     She denies a personal history of acute pancreatitis.     She denies chronic abdominal pain, early satiety, poor appetite, jaundice or unintentional weight loss.     Medical and surgical history: Pancreatic cyst, tachycardia.  Family history: No pancreatic cancer.  Social history: Non-smoker. No alcohol use. No illicits.    Objective     LMP 09/18/2024 (Approximate)      Physical Exam  A physical exam was not conducted as this was a phone or virtual visit. The patient is in no acute distress.     Assessment/Plan   Mrs. 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    PLAN: She has a pancreatic tail cyst measuring 2.8 cm. In retrospect, this pancreatic cyst was first identified " incidentally on CT in 2019. It has grown by approximately 0.8 cm in 5 years, which is not worrisome.    Radiographically, I initially presumed this to be a mucinous cystic neoplasm (MCN). However, fluid studies from prior EUS suggest a benign or serous cyst. I question if this is a serous cystadenoma (a benign cyst with negligible malignant potential). As this will guide further surveillance recommendations, I do recommend MRI/MRCP to evaluate for radiographic features consistent with a SCA. She is agreeable. I ordered and scheduled this for next month. I will call her with the results when available.      Charity Coleman PA-C

## 2024-11-12 ENCOUNTER — HOSPITAL ENCOUNTER (OUTPATIENT)
Dept: RADIOLOGY | Facility: CLINIC | Age: 49
End: 2024-11-12
Payer: COMMERCIAL

## 2024-11-12 LAB — BODY SURFACE AREA: 1.87 M2

## 2024-11-14 ENCOUNTER — OFFICE VISIT (OUTPATIENT)
Dept: URGENT CARE | Age: 49
End: 2024-11-14
Payer: COMMERCIAL

## 2024-11-14 VITALS
SYSTOLIC BLOOD PRESSURE: 136 MMHG | OXYGEN SATURATION: 99 % | RESPIRATION RATE: 18 BRPM | TEMPERATURE: 97.7 F | HEART RATE: 123 BPM | DIASTOLIC BLOOD PRESSURE: 87 MMHG

## 2024-11-14 DIAGNOSIS — J30.89 SEASONAL ALLERGIC RHINITIS DUE TO OTHER ALLERGIC TRIGGER: ICD-10-CM

## 2024-11-14 DIAGNOSIS — J01.00 ACUTE NON-RECURRENT MAXILLARY SINUSITIS: Primary | ICD-10-CM

## 2024-11-14 PROCEDURE — 99213 OFFICE O/P EST LOW 20 MIN: CPT | Performed by: NURSE PRACTITIONER

## 2024-11-14 RX ORDER — AZITHROMYCIN 250 MG/1
TABLET, FILM COATED ORAL
Qty: 6 TABLET | Refills: 0 | Status: SHIPPED | OUTPATIENT
Start: 2024-11-14

## 2024-11-14 NOTE — PROGRESS NOTES
SUBJECTIVE:   Lc Douglas is a 49 y.o. female who complains of congestion, sore throat, post nasal drip, dry cough, and headache for 10 days. She denies a history of shortness of breath, weakness, and wheezing and denies a history of asthma. Patient denies smoke cigarettes.     OBJECTIVE:  ENT:  General: Vitals noted, Ill appearing, no distress, afebrile. Normal phonation, no stridor, no trismus  ENT: TMs cloudy effusion bilaterally, EACs unremarkable. Mastoids nontender. Posterior oropharynx mucosal drainage. Uvula is in the midline and non-edematous. No Aurelio's angina.  Neck: Supple. No meningismus through full range of motion. No lymphadenopathy.   Cardiac: Regular rate and rhythm, no murmur.  Lungs: Good aeration throughout. No adventitious breath sounds.   Abdomen: Soft, nontender, nonsurgical throughout. Normoactive bowel sounds.   Extremities: No peripheral edema  Skin: No rash  Neuro: No focal neurologic deficits. NIH score is 0.     ASSESSMENT:   sinusitis    PLAN:  History and examination consistent with acute uncomplicated sinusitis. No  indication for labs or imaging at this time. No evidence of sepsis, strep pharyngitis, or  pneumonia. Counseled patient/family on antibiotic treatment and supportive measures at  home. Patient advised to return to clinic or present to ED if symptoms change or worsen.  Otherwise follow-up with PCP.

## 2024-11-14 NOTE — PATIENT INSTRUCTIONS
History and examination consistent with acute uncomplicated sinusitis. No  indication for labs or imaging at this time. No evidence of sepsis, strep pharyngitis, or  pneumonia. Counseled patient/family on antibiotic treatment and supportive measures at  home. Patient advised to return to clinic or present to ED if symptoms change or worsen.  Otherwise follow-up with PCP.

## 2024-11-19 ENCOUNTER — APPOINTMENT (OUTPATIENT)
Dept: CARDIOLOGY | Facility: CLINIC | Age: 49
End: 2024-11-19
Payer: COMMERCIAL

## 2024-11-19 NOTE — PROGRESS NOTES
"Chief Complaint:   Tachycardia      History Of Present Illness:    Ella Douglas \"Lc\" is a 49 y.o. female here with tachycardia.     For the past 5 years patient will have intermittent episodes of tachycardia. Generally occurs after she has been ill. She had covid 3 weeks ago. Last week heart rate increased to 150bpm. Sent to ED. Work up benign, Troponin negative x 1. BNP negative. CT PE negative. Symptoms improved with IV fluids. Treated for URI    She notes when heart rates become elevated bp will as well. Tachycardia can last up to 2 days. HR will max at 150bpm and improve to 100bpm, which is elevated from her baseline.     Has palpitations sometimes she will fee it in her throat and have to cough.     Does not stay hydrated.   1-2 cup coffee per day  Does not drink ETOH    Holter placed at last office visit which was normal. Average heart rate 71bpm, max HR 161bpm.     Father acute MI at 50 years old with CABG x3   Brother: HTN      Past Medical History:  She has a past medical history of Personal history of other diseases of the circulatory system.    Past Surgical History:  She has a past surgical history that includes MR angio head wo IV contrast (7/21/2022).      Social History:  She reports that she has never smoked. She has never used smokeless tobacco. She reports that she does not drink alcohol and does not use drugs.    Family History:  Family History   Problem Relation Name Age of Onset    Heart attack Father  50        Allergies:  Metoprolol    Review of Systems  All pertinent systems have been reviewed and are negative except for what is stated in the history of present illness.    All other systems have been reviewed and are negative and noncontributory to this patient's current ailments.     Visit Vitals  OB Status Having periods   Smoking Status Never     Last Labs:  CBC -  Lab Results   Component Value Date    WBC 10.9 10/05/2024    HGB 15.1 10/05/2024    HCT 41.6 10/05/2024    MCV 93 " 10/05/2024     10/05/2024       CMP -  Lab Results   Component Value Date    CALCIUM 9.3 10/05/2024    PHOS 3.5 06/22/2022    PROT 8.1 10/05/2024    ALBUMIN 4.9 10/05/2024    AST 30 10/05/2024    ALT 17 10/05/2024    ALKPHOS 71 10/05/2024    BILITOT 1.1 10/05/2024    BUN 14 10/05/2024    CREATININE 0.92 10/05/2024       LIPID PANEL -   Lab Results   Component Value Date    CHOL 168 10/22/2024    TRIG 73 10/22/2024    HDL 61.4 10/22/2024    CHHDL 2.7 10/22/2024    LDLF 93 12/06/2019    VLDL 15 10/22/2024    NHDL 107 10/22/2024       RENAL FUNCTION PANEL -   Lab Results   Component Value Date    GLUCOSE 115 (H) 10/05/2024     10/05/2024    K 3.9 10/05/2024     10/05/2024    CO2 22 10/05/2024    ANIONGAP 16 10/05/2024    BUN 14 10/05/2024    CREATININE 0.92 10/05/2024    CALCIUM 9.3 10/05/2024    PHOS 3.5 06/22/2022    ALBUMIN 4.9 10/05/2024        Lab Results   Component Value Date    BNP 14 10/05/2024    HGBA1C 4.1 12/06/2019         Objective   Vitals reviewed.   Constitutional:       Appearance: Healthy appearance. Not in distress.   Eyes:      Conjunctiva/sclera: Conjunctivae normal.   Neck:      Vascular: No JVR. JVD normal.   Pulmonary:      Effort: Pulmonary effort is normal.      Breath sounds: Normal breath sounds. No wheezing. No rhonchi. No rales.   Chest:      Chest wall: Not tender to palpatation.   Cardiovascular:      PMI at left midclavicular line. Normal rate. Regular rhythm. Normal S1. Normal S2.       Murmurs: There is no murmur.      No gallop.  No click. No rub.   Edema:     Peripheral edema absent.   Abdominal:      General: Bowel sounds are normal.      Palpations: Abdomen is soft.      Tenderness: There is no abdominal tenderness.   Musculoskeletal: Normal range of motion.         General: No tenderness. Skin:     General: Skin is warm and dry.   Neurological:      General: No focal deficit present.      Mental Status: Alert and oriented to person, place and time.    Psychiatric:         Attention and Perception: Attention normal.         Mood and Affect: Mood normal.       Assessment/Plan   Diagnoses and all orders for this visit:  Tachycardia  - Holter or Event Cardiac Monitor; Future  - TSH with reflex to Free T4 if abnormal; Future  - encouraged hydration  - discussed vasovagal techniques   - TSH WNL   Screening for cardiovascular condition  - CT cardiac scoring wo IV contrast due to father having acute MI and cabg at 50  - checking lipids  Encounter for lipid screening for cardiovascular disease  - Lipid panel; Future    Follow up 1 month    Exclusive of any other services or procedures performed, IYuliana-BABITA, spent 45 minutes in duration for this visit today.  This time consisted of chart review, obtaining history, and/or performing the exam as documented above, as well as, documenting the clinical information for the encounter in the electronic record, discussing treatment options, plans, and/or goals with patient, family, and/or caregiver, refilling medications, updating the electronic record, ordering medicines, lab work, imaging, referrals, and/or procedures as documented above and communicating with other [facility] professionals. I have discussed the results of laboratory, radiology, and cardiology studies with the patient and their family/caregiver.       I reviewed holter, pcp note, urgent care note and labs

## 2024-11-26 ENCOUNTER — OFFICE VISIT (OUTPATIENT)
Dept: CARDIOLOGY | Facility: CLINIC | Age: 49
End: 2024-11-26
Payer: COMMERCIAL

## 2024-11-26 VITALS
DIASTOLIC BLOOD PRESSURE: 80 MMHG | OXYGEN SATURATION: 98 % | SYSTOLIC BLOOD PRESSURE: 120 MMHG | WEIGHT: 165.1 LBS | HEIGHT: 66 IN | BODY MASS INDEX: 26.53 KG/M2 | HEART RATE: 118 BPM

## 2024-11-26 DIAGNOSIS — Z13.220 ENCOUNTER FOR LIPID SCREENING FOR CARDIOVASCULAR DISEASE: ICD-10-CM

## 2024-11-26 DIAGNOSIS — Z13.6 SCREENING FOR CARDIOVASCULAR CONDITION: ICD-10-CM

## 2024-11-26 DIAGNOSIS — Z13.6 ENCOUNTER FOR LIPID SCREENING FOR CARDIOVASCULAR DISEASE: ICD-10-CM

## 2024-11-26 DIAGNOSIS — R00.0 TACHYCARDIA: Primary | ICD-10-CM

## 2024-11-26 PROCEDURE — 99214 OFFICE O/P EST MOD 30 MIN: CPT | Performed by: NURSE PRACTITIONER

## 2024-11-26 PROCEDURE — 1036F TOBACCO NON-USER: CPT | Performed by: NURSE PRACTITIONER

## 2024-11-26 PROCEDURE — 3008F BODY MASS INDEX DOCD: CPT | Performed by: NURSE PRACTITIONER

## 2024-11-26 NOTE — PROGRESS NOTES
"Chief Complaint:   Tachycardia      History Of Present Illness:    Ella Douglas \"Lc\" is a 49 y.o. female here with tachycardia.     For the past 5 years patient will have intermittent episodes of tachycardia. Generally occurs after she has been ill. She had covid 3 weeks ago. Last week heart rate increased to 150bpm. Sent to ED. Work up benign, Troponin negative x 1. BNP negative. CT PE negative. Symptoms improved with IV fluids. Treated for URI    She notes when heart rates become elevated bp will as well. Tachycardia can last up to 2 days. HR will max at 150bpm and improve to 100bpm, which is elevated from her baseline.     Has palpitations sometimes she will fee; it in her throat and have to cough.     Does not stay hydrated.   1-2 cup coffee per day  Does not drink ETOH    Holter placed at last office visit which was normal. Average heart rate 71bpm, max HR 161bpm.     Since last visit heart has not been racing as much.     Checking BP at home and it is 120//90.    Father acute MI at 50 years old with CABG x3   Brother: HTN      Past Medical History:  She has a past medical history of Personal history of other diseases of the circulatory system.    Past Surgical History:  She has a past surgical history that includes MR angio head wo IV contrast (7/21/2022).      Social History:  She reports that she has never smoked. She has never used smokeless tobacco. She reports that she does not drink alcohol and does not use drugs.    Family History:  Family History   Problem Relation Name Age of Onset    Heart attack Father  50        Allergies:  Metoprolol    Review of Systems  All pertinent systems have been reviewed and are negative except for what is stated in the history of present illness.    All other systems have been reviewed and are negative and noncontributory to this patient's current ailments.     Visit Vitals  /80   Pulse (!) 118   Ht 1.676 m (5' 6\")   Wt 74.9 kg (165 lb 1.6 oz)   SpO2 98% "   BMI 26.65 kg/m²   OB Status Having periods   Smoking Status Never   BSA 1.87 m²       Last Labs:  CBC -  Lab Results   Component Value Date    WBC 10.9 10/05/2024    HGB 15.1 10/05/2024    HCT 41.6 10/05/2024    MCV 93 10/05/2024     10/05/2024       CMP -  Lab Results   Component Value Date    CALCIUM 9.3 10/05/2024    PHOS 3.5 06/22/2022    PROT 8.1 10/05/2024    ALBUMIN 4.9 10/05/2024    AST 30 10/05/2024    ALT 17 10/05/2024    ALKPHOS 71 10/05/2024    BILITOT 1.1 10/05/2024    BUN 14 10/05/2024    CREATININE 0.92 10/05/2024       LIPID PANEL -   Lab Results   Component Value Date    CHOL 168 10/22/2024    TRIG 73 10/22/2024    HDL 61.4 10/22/2024    CHHDL 2.7 10/22/2024    LDLF 93 12/06/2019    VLDL 15 10/22/2024    NHDL 107 10/22/2024       RENAL FUNCTION PANEL -   Lab Results   Component Value Date    GLUCOSE 115 (H) 10/05/2024     10/05/2024    K 3.9 10/05/2024     10/05/2024    CO2 22 10/05/2024    ANIONGAP 16 10/05/2024    BUN 14 10/05/2024    CREATININE 0.92 10/05/2024    CALCIUM 9.3 10/05/2024    PHOS 3.5 06/22/2022    ALBUMIN 4.9 10/05/2024        Lab Results   Component Value Date    BNP 14 10/05/2024    HGBA1C 4.1 12/06/2019         Objective   Vitals reviewed.   Constitutional:       Appearance: Healthy appearance. Not in distress.   Eyes:      Conjunctiva/sclera: Conjunctivae normal.   Neck:      Vascular: No JVR. JVD normal.   Pulmonary:      Effort: Pulmonary effort is normal.      Breath sounds: Normal breath sounds. No wheezing. No rhonchi. No rales.   Chest:      Chest wall: Not tender to palpatation.   Cardiovascular:      PMI at left midclavicular line. Normal rate. Regular rhythm. Normal S1. Normal S2.       Murmurs: There is no murmur.      No gallop.  No click. No rub.   Edema:     Peripheral edema absent.   Abdominal:      General: Bowel sounds are normal.      Palpations: Abdomen is soft.      Tenderness: There is no abdominal tenderness.   Musculoskeletal: Normal  range of motion.         General: No tenderness. Skin:     General: Skin is warm and dry.   Neurological:      General: No focal deficit present.      Mental Status: Alert and oriented to person, place and time.   Psychiatric:         Attention and Perception: Attention normal.         Mood and Affect: Mood normal.       Assessment/Plan   Diagnoses and all orders for this visit:  Tachycardia  - Holter or Event Cardiac Monitor normal, avg HR 71   - HR elevated today because she is anxious  - TSH WNL  - Palpitations have improved  - continue hydration and conservative treatment  - if they continue to occur we could start beta blocker   Screening for cardiovascular condition  - CT cardiac scoring wo IV contrast due to father having acute MI and cabg at 50  - Lipids WNL  Encounter for lipid screening for cardiovascular disease  - Lipid panel WNL    Follow up PRN     Outpatient Medications:  No current outpatient medications    I reviewed labs and holter     Exclusive of any other services or procedures performed, I, Yuliana HONG-BABITA, spent 22 minutes in duration for this visit today.  This time consisted of chart review, obtaining history, and/or performing the exam as documented above, as well as, documenting the clinical information for the encounter in the electronic record, discussing treatment options, plans, and/or goals with patient, family, and/or caregiver, refilling medications, updating the electronic record, ordering medicines, lab work, imaging, referrals, and/or procedures as documented above and communicating with other [facility] professionals. I have discussed the results of laboratory, radiology, and cardiology studies with the patient and their family/caregiver.       I reviewed holter, pcp note, urgent care note and labs

## 2025-02-06 ENCOUNTER — APPOINTMENT (OUTPATIENT)
Dept: RADIOLOGY | Facility: HOSPITAL | Age: 50
End: 2025-02-06
Payer: COMMERCIAL

## 2025-10-16 ENCOUNTER — APPOINTMENT (OUTPATIENT)
Dept: PRIMARY CARE | Facility: CLINIC | Age: 50
End: 2025-10-16
Payer: COMMERCIAL